# Patient Record
Sex: MALE | Race: WHITE
[De-identification: names, ages, dates, MRNs, and addresses within clinical notes are randomized per-mention and may not be internally consistent; named-entity substitution may affect disease eponyms.]

---

## 2020-11-22 ENCOUNTER — HOSPITAL ENCOUNTER (EMERGENCY)
Dept: HOSPITAL 88 - ER | Age: 60
Discharge: HOME | End: 2020-11-22
Payer: COMMERCIAL

## 2020-11-22 VITALS — BODY MASS INDEX: 23.8 KG/M2 | HEIGHT: 71 IN | WEIGHT: 170 LBS

## 2020-11-22 VITALS — DIASTOLIC BLOOD PRESSURE: 78 MMHG | SYSTOLIC BLOOD PRESSURE: 112 MMHG

## 2020-11-22 DIAGNOSIS — E03.9: ICD-10-CM

## 2020-11-22 DIAGNOSIS — L03.116: Primary | ICD-10-CM

## 2020-11-22 DIAGNOSIS — F17.210: ICD-10-CM

## 2020-11-22 PROCEDURE — 99283 EMERGENCY DEPT VISIT LOW MDM: CPT

## 2020-11-22 NOTE — EMERGENCY DEPARTMENT NOTE
History of Present Illnes


History of Present Illness


Chief Complaint:  General Medicine Complaints


History of Present Illness


This is a 59 year old  male arrived to the ED with streaking over his 

lower left leg after a spider bite.








Chief Complaint Comment PATIENT IN FROM HOME WITH COMPLAINTS OF SPIDER BITE TO 

LEFT ANKLE; STATES STARTED ABOUT 3 DAYS AGO, NOW WITH PAIN AND SWELLING.  

PATIENT ALERT AND ORIENTED, RESP EVEN AND NONLABORED, APPEARS IN NO DISTRESS, 

RATES PAIN 7/10


Historian:  Patient


Arrival Mode:  Car


Onset (how long ago):  day(s)


Radiation:  Reports non-radiation


Severity:  mild


Onset quality:  gradual


Duration (how long):  day(s)


Timing of current episode:  constant


Progression:  unchanged


Chronicity:  new


Relieving factors:  none


Associated symptoms:  Denies fever/chills, Denies loss of appetite, Denies 

weakness





Past Medical/Family History


Physician Review


I have reviewed the patient's past medical and family history.  Any updates have

been documented here.





Past Medical History


Recent Fever:  No


Clinical Suspicion of Infectio:  No


New/Unexplained Change in Ment:  No


Past Medical History:  Hypothyroidism


Other Surgery:  


HERNIA REPAIR X 4, HEMORRHOID REMOVAL, HAND SURGERY





Social History


Smoking Cessation:  Current every day smoker


Alcohol Use:  Social


Physically hurt or threatened:  No





Other


Last Tetanus:  UNK





Review of Systems


Review of Systems


Constitutional:  Reports no symptoms


EENTM:  Reports no symptoms


Cardiovascular:  Reports no symptoms


Respiratory:  Reports no symptoms


Gastrointestinal:  Reports no symptoms


Genitourinary:  Reports no symptoms


Musculoskeletal:  Reports as per HPI


Integumentary:  Reports no symptoms


Neurological:  Reports no symptoms


Psychological:  Reports no symptoms


Endocrine:  Reports no symptoms


Hematological/Lymphatic:  Reports no symptoms





Physical Exam


Related Data


Allergies:  


Coded Allergies:  


     No Known Drug Allergies (Verified  Allergy, Unknown, NONE, 11/22/20)


Triage Vital Signs





Vital Signs








  Date Time  Temp Pulse Resp B/P (MAP) Pulse Ox O2 Delivery O2 Flow Rate FiO2


 


11/22/20 14:04 98.0 96 20 128/79 97 Room Air  








Vital signs reviewed:  Yes





Physical Exam


CONSTITUTIONAL





Constitutional:  Present well-developed, Present well-nourished


HENT


HENT:  Present normocephalic, Present atraumatic, Present oropharynx 

clear/moist, Present nose normal


HENT L/R:  Present left ext ear normal, Present right ext ear normal


EYES





Eyes:  Reports PERRL, Reports conjunctivae normal


NECK


Neck:  Present ROM normal


PULMONARY


Pulmonary:  Present effort normal, Present breath sounds normal


CARDIOVASCULAR





Cardiovascular:  Present regular rhythm, Present heart sounds normal, Present 

capillary refill normal, Present normal rate


GASTROINTESTINAL





Abdominal:  Present soft, Present nontender, Present bowel sounds normal


GENITOURINARY





Genitourinary:  Present exam deferred


SKIN


Skin:  Present warm, Present dry, Present erythema (1 cm circular ulcer noted 

while circumcised with scabbed face, erythema streaks noted over anterior distal

lower extremity, no palpable fluid collection/fluctuance/abscess, compartments 

are soft, patient her vastly intact)


MUSCULOSKELETAL





Musculoskeletal:  Present ROM normal


NEUROLOGICAL





Neurological:  Present alert, Present oriented x 3, Present no gross motor or 

sensory deficits


PSYCHOLOGICAL


Psychological:  Present mood/affect normal, Present judgement normal





Assessment & Plan


Medical Decision Making


MDM


This patient presents with initial presentation of local erythema, warmth, 

swelling concerning for cellulitis.


Sensitivity/pain to light touch around the erythematous area.


No lymphangitic spread visible and no fluid pockets or fluctuance c/f abscess 

noted.


Low c/f osteomyelitis or DVT.


No immune compromise, bullae, pain out of proportion, or rapid progression c/f 

necrotizing fasciitis.





In ED: Erythema outlined


Rx: Cephalexin 500mg PO q6hrs and Bactrim





Disposition: No evidence of serious bacterial illness requiring admission for IV

antibiotics. Nontoxic appearing, VSS. Low risk for treatment failure based on 

history. Will discharge home with PO antibiotics and return precautions 

discussed at bedside.





Assessment & Plan


Final Impression:  


(1) Cellulitis


Depart Disposition:  HOME, SELF-CARE


Last Vital Signs











  Date Time  Temp Pulse Resp B/P (MAP) Pulse Ox O2 Delivery O2 Flow Rate FiO2


 


11/22/20 14:04 98.0 96 20 128/79 97 Room Air  








Home Meds


Reported Medications


Metronidazole (FLAGYL) 500 Mg Tablet, 500 TAB PO Q6H


   NEW RX


   2/14/13


Levofloxacin (LEVAQUIN) 500 Mg Tablet, 500 MG PO DAILY


   NEW RX


   2/14/13


Levothyroxine Sodium (LEVOTHYROXINE SODIUM) 125 Mcg Tablet, 125 MCG PO DAILY


   2/11/13











PAUL BRUCE DO            Nov 22, 2020 14:22

## 2021-08-28 ENCOUNTER — HOSPITAL ENCOUNTER (EMERGENCY)
Dept: HOSPITAL 88 - ER | Age: 61
Discharge: HOME | End: 2021-08-28
Payer: COMMERCIAL

## 2021-08-28 VITALS — BODY MASS INDEX: 23.8 KG/M2 | HEIGHT: 71 IN | WEIGHT: 170 LBS

## 2021-08-28 VITALS — SYSTOLIC BLOOD PRESSURE: 122 MMHG | DIASTOLIC BLOOD PRESSURE: 67 MMHG

## 2021-08-28 DIAGNOSIS — E03.9: ICD-10-CM
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
## 2021-08-28 DIAGNOSIS — S82.831A: Primary | ICD-10-CM
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
## 2021-08-28 DIAGNOSIS — F17.210: ICD-10-CM

## 2021-08-28 PROCEDURE — 99284 EMERGENCY DEPT VISIT MOD MDM: CPT
